# Patient Record
Sex: FEMALE | Race: BLACK OR AFRICAN AMERICAN | NOT HISPANIC OR LATINO
[De-identification: names, ages, dates, MRNs, and addresses within clinical notes are randomized per-mention and may not be internally consistent; named-entity substitution may affect disease eponyms.]

---

## 2023-04-26 ENCOUNTER — APPOINTMENT (OUTPATIENT)
Dept: ORTHOPEDIC SURGERY | Facility: CLINIC | Age: 58
End: 2023-04-26
Payer: COMMERCIAL

## 2023-04-26 ENCOUNTER — NON-APPOINTMENT (OUTPATIENT)
Age: 58
End: 2023-04-26

## 2023-04-26 VITALS — BODY MASS INDEX: 27.25 KG/M2 | HEIGHT: 69 IN | WEIGHT: 184 LBS

## 2023-04-26 PROCEDURE — 73564 X-RAY EXAM KNEE 4 OR MORE: CPT | Mod: LT

## 2023-04-26 PROCEDURE — 20610 DRAIN/INJ JOINT/BURSA W/O US: CPT

## 2023-04-26 PROCEDURE — 99204 OFFICE O/P NEW MOD 45 MIN: CPT | Mod: 25

## 2023-04-26 RX ORDER — METHYLPREDNISOLONE 4 MG/1
4 TABLET ORAL
Qty: 1 | Refills: 0 | Status: ACTIVE | COMMUNITY
Start: 2023-04-26 | End: 1900-01-01

## 2023-04-26 NOTE — PROCEDURE
[FreeTextEntry3] : Aspiration Procedure Note:\par \par The risks, benefits, and alternatives to aspiration were reviewed with the patient.  Risks outlined include but are not limited to infection, sepsis, bleeding, temporary increase in pain, syncopal episode, failure to resolve symptoms, and symptoms recurrence. Patient understood the risks and asked to proceed with this treatment course.\par \par Patient Identification\par Name/: Verbal with patient and/or family\par \par Procedure Verification:\par Procedure confirmed with patient or family/designee\par Consent for procedure: Verbal Consent Given\par Relevant documentation completed, reviewed, and signed\par Clinical indications for procedure confirmed\par \par Time-out with all members of procedure team immediately prior to procedure:\par Correct patient identified. Agreement on procedure. Correct side and site.\par \par KNEE ASPIRATION - LEFT\par After verbal consent and identification of the correct patient and correct site, the superolateral left knee was prepped using alcohol swabs and betadine. This was allowed time to air dry. \par 47 cc of yellow fluid was aspirated from the knee using a sterile 18G needle after ethyl chloride spray for skin anesthesia. The patient tolerated the procedure well. After-care instructions were provided and included instructions to ice the area and to call if redness, pain, or fever develop.\par

## 2023-04-26 NOTE — ASSESSMENT
[FreeTextEntry1] : Imaging was reviewed and independently interpreted\par [facility] tircomp deg, MMT, synov, fat pad scarring, acl mucoid change\par \par  Left X-Ray Examination of the KNEE (4 views): medial and patellofemoral degenerate changes.\par \par - We discussed their diagnosis and treatment options at length including the risks and benefits of both surgical treatment with a knee replacement and non-surgical options.\par - We will continue conservative treatment with activity modification, PT, icing, weight loss, and anti-inflammatory medications.\par - The patient was provided with a PT prescription to work on ROM, hip ER/abductors strengthening, quad/hamstring stretches and strengthening, and other exercises \par - The patient was advised to let pain guide the gradual advancement of activities. \par - asp knee sheyla well\par - MDP rx\par - Discussed the possible side effects of medication along with the timing and frequency for taking.\par - Follow up as needed in 6 weeks to re-evaluate, if no improvement we spoke about possibility of viscosupplementation injections\par

## 2023-04-26 NOTE — IMAGING
[de-identified] : \par LEFT KNEE\par Inspection:  mod effusion, pop cyst\par Palpation: medial joint line tenderness, anterior tenderness\par Knee Range of Motion:  3-125 \par Strength: 5/5 Quadriceps strength, 5/5 Hamstring strength\par Neurological: light touch is intact throughout\par Ligament Stability and Special Tests: \par McMurrays: pos\par Lachman: neg\par Pivot Shift: neg\par Posterior Drawer: neg\par Valgus: neg\par Varus: neg\par Patella Apprehension: neg\par Patella Maltracking: neg\par

## 2023-04-26 NOTE — HISTORY OF PRESENT ILLNESS
[de-identified] : 57 year old female  (RHD,  LIRR )  left knee pain since 12/2021 , had meniscus surgery but never really got better, pain worsening since 2023\par The pain is located  anterior, medial, and deep\par The pain is associated with  swelling, clicking, buckling \par Worse with activity and better at rest.\par Has tried ice, nsaids, cane, PT\par H/O left knee meniscus sx 5/2022 - Dr. Jairo Carr

## 2023-04-28 ENCOUNTER — APPOINTMENT (OUTPATIENT)
Dept: ORTHOPEDIC SURGERY | Facility: CLINIC | Age: 58
End: 2023-04-28
Payer: COMMERCIAL

## 2023-04-28 PROCEDURE — L1833: CPT | Mod: LT

## 2023-05-31 ENCOUNTER — APPOINTMENT (OUTPATIENT)
Dept: ORTHOPEDIC SURGERY | Facility: CLINIC | Age: 58
End: 2023-05-31
Payer: COMMERCIAL

## 2023-05-31 ENCOUNTER — NON-APPOINTMENT (OUTPATIENT)
Age: 58
End: 2023-05-31

## 2023-05-31 VITALS — HEIGHT: 69 IN | WEIGHT: 190 LBS | BODY MASS INDEX: 28.14 KG/M2

## 2023-05-31 PROCEDURE — 99214 OFFICE O/P EST MOD 30 MIN: CPT | Mod: 25

## 2023-05-31 PROCEDURE — 20610 DRAIN/INJ JOINT/BURSA W/O US: CPT

## 2023-05-31 PROCEDURE — J3490M: CUSTOM

## 2023-05-31 NOTE — IMAGING
[de-identified] : \par LEFT KNEE\par Inspection:  mod effusion, pop cyst\par Palpation: medial joint line tenderness, anterior tenderness\par Knee Range of Motion:  3-125 \par Strength: 5/5 Quadriceps strength, 5/5 Hamstring strength\par Neurological: light touch is intact throughout\par Ligament Stability and Special Tests: \par McMurrays: pos\par Lachman: neg\par Pivot Shift: neg\par Posterior Drawer: neg\par Valgus: neg\par Varus: neg\par Patella Apprehension: neg\par Patella Maltracking: neg\par

## 2023-05-31 NOTE — WORK
[Does not reveal pre-existing condition(s) that may affect treatment/prognosis] : does not reveal pre-existing condition(s) that may affect treatment/prognosis [Patient] : patient [I provided the services listed above] :  I provided the services listed above. [Total (100%)] : total (100%) [Cannot return to work because ________] : cannot return to work because [unfilled]

## 2023-05-31 NOTE — ASSESSMENT
[FreeTextEntry1] : mri left knee [facility] 4/18/23 - tircomp deg, MMT, synov, fat pad scarring, acl mucoid change\par \par now with exab of underlying arthritis and meniscus tear subsqeuent to her work injury \par \par - We discussed their diagnosis and treatment options at length including the risks and benefits of both surgical treatment with a knee replacement and non-surgical options.\par - We will continue conservative treatment with activity modification, PT, icing, weight loss, and anti-inflammatory medications.\par - The patient was provided with a PT prescription to work on ROM, hip ER/abductors strengthening, quad/hamstring stretches and strengthening, and other exercises \par - The patient was advised to let pain guide the gradual advancement of activities. \par - We also discussed the possible of a corticosteroid injection in order to help decrease inflammation and pain so that they can perform better therapy and they wished to proceed with this treatment course..\par - Follow up as needed in 6 weeks to re-evaluate, if no improvement we spoke about possibility of viscosupplementation injections - will submit for auth\par - oow cant perform her normal duties\par \par (may eventually need TKA)\par

## 2023-05-31 NOTE — HISTORY OF PRESENT ILLNESS
[de-identified] : \par 57 year old female  (RHD,  LIRR )  left knee pain since injury at work 12/22/2021 , had subq meniscus surgery and went to PT but never really got better, pain worsening now since 2023\par The pain is located  anterior, medial, and deep\par The pain is associated with  swelling, clicking, buckling \par Worse with activity and better at rest.\par Has tried ice, nsaids, cane, PT\par H/O left knee meniscus sx 5/2022 - Dr. Jairo Carr\par no prior knee issues before her work injury\par \par 5/31/23 - asp knee, sent mdp, cont to have pain along with buckling and giving out, has been oow

## 2023-06-28 ENCOUNTER — APPOINTMENT (OUTPATIENT)
Dept: ORTHOPEDIC SURGERY | Facility: CLINIC | Age: 58
End: 2023-06-28
Payer: COMMERCIAL

## 2023-06-28 PROCEDURE — 99214 OFFICE O/P EST MOD 30 MIN: CPT | Mod: 25

## 2023-06-28 PROCEDURE — 20610 DRAIN/INJ JOINT/BURSA W/O US: CPT

## 2023-06-28 NOTE — IMAGING
[de-identified] : \par LEFT KNEE\par Inspection:  mod effusion, pop cyst\par Palpation: medial joint line tenderness, anterior tenderness\par Knee Range of Motion:  3-125 \par Strength: 5/5 Quadriceps strength, 5/5 Hamstring strength\par Neurological: light touch is intact throughout\par Ligament Stability and Special Tests: \par McMurrays: pos\par Lachman: neg\par Pivot Shift: neg\par Posterior Drawer: neg\par Valgus: neg\par Varus: neg\par Patella Apprehension: neg\par Patella Maltracking: neg\par

## 2023-06-28 NOTE — HISTORY OF PRESENT ILLNESS
[de-identified] : \par 57 year old female  (RHD,  LIRR )  left knee pain since injury at work 12/22/2021 , had subq meniscus surgery and went to PT but never really got better, pain worsening now since 2023\par The pain is located  anterior, medial, and deep\par The pain is associated with  swelling, clicking, buckling \par Worse with activity and better at rest.\par Has tried ice, nsaids, cane, PT\par H/O left knee meniscus sx 5/2022 - Dr. Jairo Carr\par no prior knee issues before her work injury\par \par 5/31/23 - asp knee, sent mdp, cont to have pain along with buckling and giving out, has been oow\par 6/28/23- here with continued left knee pain, CSI (5/31) with temp relief, would like to discuss poss visco injections

## 2023-06-28 NOTE — ASSESSMENT
[FreeTextEntry1] : mri left knee [facility] 4/18/23 - tircomp deg, MMT, synov, fat pad scarring, acl mucoid change\par \par now with exab of underlying arthritis and meniscus tear subsqeuent to her work injury \par \par - We discussed their diagnosis and treatment options at length including the risks and benefits of both surgical treatment with a knee replacement and non-surgical options.\par - We will continue conservative treatment with activity modification, PT, icing, weight loss, and anti-inflammatory medications.\par - The patient was provided with a PT prescription to work on ROM, hip ER/abductors strengthening, quad/hamstring stretches and strengthening, and other exercises \par - The patient was advised to let pain guide the gradual advancement of activities. \par - We also discussed the possibility of viscosupplementation injections and she would like to proceed\par - left knee euflexxa #1 - sheyla well \par - oow cant perform her normal duties\par \par (may eventually need TKA)\par

## 2023-07-05 ENCOUNTER — APPOINTMENT (OUTPATIENT)
Dept: ORTHOPEDIC SURGERY | Facility: CLINIC | Age: 58
End: 2023-07-05

## 2023-07-10 ENCOUNTER — APPOINTMENT (OUTPATIENT)
Dept: ORTHOPEDIC SURGERY | Facility: CLINIC | Age: 58
End: 2023-07-10
Payer: COMMERCIAL

## 2023-07-10 PROCEDURE — 99212 OFFICE O/P EST SF 10 MIN: CPT | Mod: 25

## 2023-07-10 PROCEDURE — 20610 DRAIN/INJ JOINT/BURSA W/O US: CPT

## 2023-07-10 NOTE — HISTORY OF PRESENT ILLNESS
[de-identified] : \par 57 year old female  (RHD,  LIRR )  left knee pain since injury at work 12/22/2021 , had subq meniscus surgery and went to PT but never really got better, pain worsening now since 2023\par The pain is located  anterior, medial, and deep\par The pain is associated with  swelling, clicking, buckling \par Worse with activity and better at rest.\par Has tried ice, nsaids, cane, PT\par H/O left knee meniscus sx 5/2022 - Dr. Jairo Carr\par no prior knee issues before her work injury\par \par 5/31/23 - asp knee, sent mdp, cont to have pain along with buckling and giving out, has been oow\par 6/28/23- here with continued left knee pain, CSI (5/31) with temp relief, would like to discuss poss visco injections  \par 7/10/23- left knee euflexxa #2

## 2023-07-10 NOTE — PROCEDURE
[FreeTextEntry3] : \par Injection Procedure Note:\par \par The risks, benefits, and alternatives to viscosupplementation injection were reviewed with the patient. Risks outlined include but are not limited to infection, sepsis, bleeding, scarring, temporary increase in pain, syncopal episode, failure to resolve symptoms, symptoms recurrence, allergic reaction, flare reaction, pseudoseptic reaction.  Patient understood the risks and asked to proceed with this treatment course.\par \par Patient Identification\par Name/: Verbal with patient and/or family\par \par Procedure Verification:\par Procedure confirmed with patient or family/designee\par Consent for procedure: Verbal Consent Given\par Relevant documentation completed, reviewed, and signed\par Clinical indications for procedure confirmed\par \par Time-out with all members of procedure team immediately prior to procedure:\par Correct patient identified. Agreement on procedure. Correct side and site.\par \par \par KNEE INJECTION (Visco) - LEFT\par After verbal consent and identification of the correct patient and correct site, the left knee were prepped using alcohol swabs and betadine. This was allowed time to air dry. \par An injection of EUFLEXXA 2ml  #2 \par was injected into the knee using a sterile 22G needle after ethyl chloride spray for skin anesthesia.  The patient tolerated the procedure well. After-care instructions were provided and included instructions to ice the area and to call if redness, pain, or fever develop.\par

## 2023-07-10 NOTE — ASSESSMENT
[FreeTextEntry1] : mri left knee [facility] 4/18/23 - tircomp deg, MMT, synov, fat pad scarring, acl mucoid change\par \par now with exab of underlying arthritis and meniscus tear subsqeuent to her work injury \par \par - We discussed their diagnosis and treatment options at length including the risks and benefits of both surgical treatment with a knee replacement and non-surgical options.\par - We will continue conservative treatment with activity modification, PT, icing, weight loss, and anti-inflammatory medications.\par - The patient was provided with a PT prescription to work on ROM, hip ER/abductors strengthening, quad/hamstring stretches and strengthening, and other exercises \par - The patient was advised to let pain guide the gradual advancement of activities. \par - We also discussed the possibility of viscosupplementation injections and she would like to proceed\par - left knee euflexxa #2 - sheyla well \par - oow cant perform her normal duties\par \par (may eventually need TKA)\par

## 2023-07-10 NOTE — IMAGING
[de-identified] : \par LEFT KNEE\par Inspection:  mod effusion, pop cyst\par Palpation: medial joint line tenderness, anterior tenderness\par Knee Range of Motion:  3-125 \par Strength: 5/5 Quadriceps strength, 5/5 Hamstring strength\par Neurological: light touch is intact throughout\par Ligament Stability and Special Tests: \par McMurrays: pos\par Lachman: neg\par Pivot Shift: neg\par Posterior Drawer: neg\par Valgus: neg\par Varus: neg\par Patella Apprehension: neg\par Patella Maltracking: neg\par

## 2023-07-17 ENCOUNTER — APPOINTMENT (OUTPATIENT)
Dept: ORTHOPEDIC SURGERY | Facility: CLINIC | Age: 58
End: 2023-07-17
Payer: COMMERCIAL

## 2023-07-17 DIAGNOSIS — S83.232A COMPLEX TEAR OF MEDIAL MENISCUS, CURRENT INJURY, LEFT KNEE, INITIAL ENCOUNTER: ICD-10-CM

## 2023-07-17 PROCEDURE — 99212 OFFICE O/P EST SF 10 MIN: CPT | Mod: 25

## 2023-07-17 PROCEDURE — 20610 DRAIN/INJ JOINT/BURSA W/O US: CPT | Mod: LT

## 2023-07-17 NOTE — HISTORY OF PRESENT ILLNESS
[de-identified] : \par 57 year old female  (RHD,  LIRR )  left knee pain since injury at work 12/22/2021 , had subq meniscus surgery and went to PT but never really got better, pain worsening now since 2023\par The pain is located  anterior, medial, and deep\par The pain is associated with  swelling, clicking, buckling \par Worse with activity and better at rest.\par Has tried ice, nsaids, cane, PT\par H/O left knee meniscus sx 5/2022 - Dr. Jairo Carr\par no prior knee issues before her work injury\par \par 5/31/23 - asp knee, sent mdp, cont to have pain along with buckling and giving out, has been oow\par 6/28/23- here with continued left knee pain, CSI (5/31) with temp relief, would like to discuss poss visco injections  \par 7/10/23- left knee euflexxa #2\par 7/17/23- left knee euflexxa #3

## 2023-07-17 NOTE — PROCEDURE
[FreeTextEntry3] : \par Injection Procedure Note:\par \par The risks, benefits, and alternatives to viscosupplementation injection were reviewed with the patient. Risks outlined include but are not limited to infection, sepsis, bleeding, scarring, temporary increase in pain, syncopal episode, failure to resolve symptoms, symptoms recurrence, allergic reaction, flare reaction, pseudoseptic reaction.  Patient understood the risks and asked to proceed with this treatment course.\par \par Patient Identification\par Name/: Verbal with patient and/or family\par \par Procedure Verification:\par Procedure confirmed with patient or family/designee\par Consent for procedure: Verbal Consent Given\par Relevant documentation completed, reviewed, and signed\par Clinical indications for procedure confirmed\par \par Time-out with all members of procedure team immediately prior to procedure:\par Correct patient identified. Agreement on procedure. Correct side and site.\par \par \par KNEE INJECTION (Visco) - LEFT\par After verbal consent and identification of the correct patient and correct site, the left knee were prepped using alcohol swabs and betadine. This was allowed time to air dry. \par An injection of EUFLEXXA 2ml  #3 \par was injected into the knee using a sterile 22G needle after ethyl chloride spray for skin anesthesia.  The patient tolerated the procedure well. After-care instructions were provided and included instructions to ice the area and to call if redness, pain, or fever develop.\par

## 2023-07-17 NOTE — IMAGING
[de-identified] : \par LEFT KNEE\par Inspection:  mod effusion, pop cyst\par Palpation: medial joint line tenderness, anterior tenderness\par Knee Range of Motion:  3-125 \par Strength: 5/5 Quadriceps strength, 5/5 Hamstring strength\par Neurological: light touch is intact throughout\par Ligament Stability and Special Tests: \par McMurrays: pos\par Lachman: neg\par Pivot Shift: neg\par Posterior Drawer: neg\par Valgus: neg\par Varus: neg\par Patella Apprehension: neg\par Patella Maltracking: neg\par

## 2023-07-17 NOTE — ASSESSMENT
[FreeTextEntry1] : mri left knee [facility] 4/18/23 - tircomp deg, MMT, synov, fat pad scarring, acl mucoid change\par \par now with exab of underlying arthritis and meniscus tear subsqeuent to her work injury \par \par - We discussed their diagnosis and treatment options at length including the risks and benefits of both surgical treatment with a knee replacement and non-surgical options.\par - We will continue conservative treatment with activity modification, PT, icing, weight loss, and anti-inflammatory medications.\par - The patient was provided with a PT prescription to work on ROM, hip ER/abductors strengthening, quad/hamstring stretches and strengthening, and other exercises \par - The patient was advised to let pain guide the gradual advancement of activities. \par - We also discussed the possibility of viscosupplementation injections and she would like to proceed\par - left knee euflexxa #3 - sheyla well \par - oow cant perform her normal duties\par \par (may eventually need TKA)\par

## 2023-08-28 ENCOUNTER — APPOINTMENT (OUTPATIENT)
Dept: ORTHOPEDIC SURGERY | Facility: CLINIC | Age: 58
End: 2023-08-28
Payer: COMMERCIAL

## 2023-08-28 VITALS — HEIGHT: 69 IN | BODY MASS INDEX: 27.4 KG/M2 | WEIGHT: 185 LBS

## 2023-08-28 PROCEDURE — 99213 OFFICE O/P EST LOW 20 MIN: CPT

## 2023-08-28 NOTE — IMAGING
[de-identified] : \par  LEFT KNEE\par  Inspection:  mod effusion, pop cyst\par  Palpation: medial joint line tenderness, anterior tenderness\par  Knee Range of Motion:  3-125 \par  Strength: 5/5 Quadriceps strength, 5/5 Hamstring strength\par  Neurological: light touch is intact throughout\par  Ligament Stability and Special Tests: \par  McMurrays: pos\par  Lachman: neg\par  Pivot Shift: neg\par  Posterior Drawer: neg\par  Valgus: neg\par  Varus: neg\par  Patella Apprehension: neg\par  Patella Maltracking: neg\par

## 2023-08-28 NOTE — HISTORY OF PRESENT ILLNESS
[de-identified] : 57 year old female  (RHD,  LIRR )  left knee pain since injury at work 12/22/2021 , had subq meniscus surgery and went to PT but never really got better, pain worsening now since 2023 The pain is located  anterior, medial, and deep The pain is associated with  swelling, clicking, buckling  Worse with activity and better at rest. Has tried ice, nsaids, cane, PT H/O left knee meniscus sx 5/2022 - Dr. Jairo Carr no prior knee issues before her work injury  5/31/23 - asp knee, sent mdp, cont to have pain along with buckling and giving out, has been oow 6/28/23- here with continued left knee pain, CSI (5/31) with temp relief, would like to discuss poss visco injections   7/10/23- left knee euflexxa #2 7/17/23- left knee euflexxa #3 8/28/23- doing PT and HEP

## 2023-08-28 NOTE — ASSESSMENT
[FreeTextEntry1] : mri left knee [facility] 4/18/23 - tircomp deg, MMT, synov, fat pad scarring, acl mucoid change  now with exab of underlying arthritis and meniscus tear subsqeuent to her work injury   - We discussed their diagnosis and treatment options at length including the risks and benefits of both surgical treatment with a knee replacement and non-surgical options. - We will continue conservative treatment with activity modification, PT, icing, weight loss, and anti-inflammatory medications. - The patient was provided with a PT prescription to work on ROM, hip ER/abductors strengthening, quad/hamstring stretches and strengthening, and other exercises  - The patient was advised to let pain guide the gradual advancement of activities.  - can return to work  (may eventually need TKA)

## 2023-09-06 ENCOUNTER — APPOINTMENT (OUTPATIENT)
Dept: ORTHOPEDIC SURGERY | Facility: CLINIC | Age: 58
End: 2023-09-06
Payer: COMMERCIAL

## 2023-09-06 PROCEDURE — 99213 OFFICE O/P EST LOW 20 MIN: CPT

## 2023-09-06 RX ORDER — METHYLPREDNISOLONE 4 MG/1
4 TABLET ORAL
Qty: 1 | Refills: 0 | Status: ACTIVE | COMMUNITY
Start: 2023-09-06 | End: 1900-01-01

## 2023-09-06 NOTE — HISTORY OF PRESENT ILLNESS
[de-identified] : 57 year old female  (RHD,  LIRR )  left knee pain since injury at work 12/22/2021 , had subq meniscus surgery and went to PT but never really got better, pain worsening now since 2023 The pain is located  anterior, medial, and deep The pain is associated with  swelling, clicking, buckling  Worse with activity and better at rest. Has tried ice, nsaids, cane, PT H/O left knee meniscus sx 5/2022 - Dr. Jairo Carr no prior knee issues before her work injury  5/31/23 - asp knee, sent mdp, cont to have pain along with buckling and giving out, has been oow 6/28/23- here with continued left knee pain, CSI (5/31) with temp relief, would like to discuss poss visco injections   7/10/23- left knee euflexxa #2 7/17/23- left knee euflexxa #3 8/28/23- doing PT and HEP 9/6/23- L knee improving with PT but getting cut off from PT by insurance,  doing HEP

## 2023-09-06 NOTE — ASSESSMENT
[FreeTextEntry1] : mri left knee [facility] 4/18/23 - tircomp deg, MMT, synov, fat pad scarring, acl mucoid change  now with exab of underlying arthritis and meniscus tear subsqeuent to her work injury   - We discussed their diagnosis and treatment options at length including the risks and benefits of both surgical treatment with a knee replacement and non-surgical options. - We will continue conservative treatment with activity modification, PT, icing, weight loss, and anti-inflammatory medications. - The patient was provided with a PT prescription to work on ROM, hip ER/abductors strengthening, quad/hamstring stretches and strengthening, and other exercises  - The patient was advised to let pain guide the gradual advancement of activities.  - MDP rx - Discussed possible side effects of medication along with timing and frequency for taking - can return to work  FORMAL REQUEST AUTHORIZATION FOR PHYSICAL THERAPY - Patient needs to continue PT in order to help the patient in their functional return as they have made improvement from this modality.   (may eventually need TKA)

## 2023-09-06 NOTE — IMAGING
[de-identified] : \par  LEFT KNEE\par  Inspection:  mod effusion, pop cyst\par  Palpation: medial joint line tenderness, anterior tenderness\par  Knee Range of Motion:  3-125 \par  Strength: 5/5 Quadriceps strength, 5/5 Hamstring strength\par  Neurological: light touch is intact throughout\par  Ligament Stability and Special Tests: \par  McMurrays: pos\par  Lachman: neg\par  Pivot Shift: neg\par  Posterior Drawer: neg\par  Valgus: neg\par  Varus: neg\par  Patella Apprehension: neg\par  Patella Maltracking: neg\par

## 2023-10-04 ENCOUNTER — APPOINTMENT (OUTPATIENT)
Dept: ORTHOPEDIC SURGERY | Facility: CLINIC | Age: 58
End: 2023-10-04
Payer: COMMERCIAL

## 2023-10-04 VITALS — WEIGHT: 182 LBS | BODY MASS INDEX: 26.96 KG/M2 | HEIGHT: 69 IN

## 2023-10-04 PROCEDURE — 99214 OFFICE O/P EST MOD 30 MIN: CPT | Mod: 25

## 2023-10-04 PROCEDURE — J3490M: CUSTOM

## 2023-10-04 PROCEDURE — 20610 DRAIN/INJ JOINT/BURSA W/O US: CPT | Mod: LT

## 2023-10-26 ENCOUNTER — APPOINTMENT (OUTPATIENT)
Dept: ORTHOPEDIC SURGERY | Facility: CLINIC | Age: 58
End: 2023-10-26

## 2024-01-03 ENCOUNTER — APPOINTMENT (OUTPATIENT)
Dept: ORTHOPEDIC SURGERY | Facility: CLINIC | Age: 59
End: 2024-01-03
Payer: COMMERCIAL

## 2024-01-03 PROCEDURE — 99213 OFFICE O/P EST LOW 20 MIN: CPT

## 2024-01-03 NOTE — ASSESSMENT
[FreeTextEntry1] : mri left knee [facility] 4/18/23 - tircomp deg, MMT, synov, fat pad scarring, acl mucoid change  now with exab of underlying arthritis and meniscus tear subsqeuent to her work injury  - We discussed their diagnosis and treatment options at length including the risks and benefits of both surgical treatment with a knee replacement and non-surgical options. - We will continue conservative treatment with activity modification, PT, icing, weight loss, and anti-inflammatory medications. - The patient was provided with a PT prescription to work on ROM, hip ER/abductors strengthening, quad/hamstring stretches and strengthening, and other exercises - The patient is to continue home exercises learned at physical therapy. - The patient was advised to let pain guide the gradual advancement of activities. - MDP - Discussed possible side effects of medication along with timing and frequency for taking   **I have reviewed her job description and the patient is unable to continue to work as a  because it will continue to aggravate her knee. 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

## 2024-01-03 NOTE — IMAGING
[de-identified] : \par  LEFT KNEE\par  Inspection:  mod effusion, pop cyst\par  Palpation: medial joint line tenderness, anterior tenderness\par  Knee Range of Motion:  3-125 \par  Strength: 5/5 Quadriceps strength, 5/5 Hamstring strength\par  Neurological: light touch is intact throughout\par  Ligament Stability and Special Tests: \par  McMurrays: pos\par  Lachman: neg\par  Pivot Shift: neg\par  Posterior Drawer: neg\par  Valgus: neg\par  Varus: neg\par  Patella Apprehension: neg\par  Patella Maltracking: neg\par

## 2024-01-03 NOTE — HISTORY OF PRESENT ILLNESS
[de-identified] : 58 year old female  (RHD,  LIRR )  left knee pain since injury at work 12/22/2021 , had subq meniscus surgery and went to PT but never really got better, pain worsening now since 2023 The pain is located  anterior, medial, and deep The pain is associated with  swelling, clicking, buckling  Worse with activity and better at rest. Has tried ice, nsaids, cane, PT H/O left knee meniscus sx 5/2022 - Dr. Jairo aCrr no prior knee issues before her work injury  5/31/23 - asp knee, sent mdp, cont to have pain along with buckling and giving out, has been oow 6/28/23- here with continued left knee pain, CSI (5/31) with temp relief, would like to discuss poss visco injections   7/10/23 - left knee euflexxa #2 7/17/23 - left knee euflexxa #3 8/28/23 - doing PT and HEP 9/6/23- L knee improving with PT but getting cut off from PT by insurance,  doing HEP 10/4/23-  doing HEP, cont achiness and stiffness continues 1/3/23- had CSI ( 10/4)  with good temp relif, still some achiness and pain now

## 2024-03-13 ENCOUNTER — APPOINTMENT (OUTPATIENT)
Dept: ORTHOPEDIC SURGERY | Facility: CLINIC | Age: 59
End: 2024-03-13
Payer: COMMERCIAL

## 2024-03-13 PROCEDURE — 99213 OFFICE O/P EST LOW 20 MIN: CPT

## 2024-03-13 NOTE — HISTORY OF PRESENT ILLNESS
[de-identified] : 58 year old female  (RHD,  LIRR )  left knee pain since injury at work 12/22/2021 , had subq meniscus surgery and went to PT but never really got better, pain worsening now since 2023 The pain is located  anterior, medial, and deep The pain is associated with  swelling, clicking, buckling  Worse with activity and better at rest. Has tried ice, nsaids, cane, PT H/O left knee meniscus sx 5/2022 - Dr. Jairo Carr no prior knee issues before her work injury  5/31/23 - asp knee, sent mdp, cont to have pain along with buckling and giving out, has been oow 6/28/23- here with continued left knee pain, CSI (5/31) with temp relief, would like to discuss poss visco injections   7/10/23 - left knee euflexxa #2 7/17/23 - left knee euflexxa #3 8/28/23 - doing PT and HEP 9/6/23- L knee improving with PT but getting cut off from PT by insurance,  doing HEP 10/4/23-  doing HEP, cont achiness and stiffness continues 1/3/23- had CSI ( 10/4)  with good temp relif, still some achiness and pain now 3/13/24- L knee improving, going to PT (EVELYN Garber)  and doing HEP, and doing acuunopunture

## 2024-03-13 NOTE — ASSESSMENT
[FreeTextEntry1] : mri left knee [facility] 4/18/23 - tircomp deg, MMT, synov, fat pad scarring, acl mucoid change  now with exab of underlying arthritis and meniscus tear subsqeuent to her work injury  - We discussed their diagnosis and treatment options at length including the risks and benefits of both surgical treatment with a knee replacement and non-surgical options. - We will continue conservative treatment with activity modification, PT, icing, weight loss, and anti-inflammatory medications. - The patient was provided with a PT prescription to work on ROM, hip ER/abductors strengthening, quad/hamstring stretches and strengthening, and other exercises - The patient is to continue home exercises learned at physical therapy. - The patient was advised to let pain guide the gradual advancement of activities. - The patient was advised to apply ice (wrapped in a towel or protective covering) to the area daily (20 minutes at a time, 2-4X/day). - submit for auth for visco - discussed poss PRP inj  **I have reviewed her job description and the patient is unable to continue to work as a  because it will continue to aggravate her knee. 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

## 2024-03-13 NOTE — IMAGING
[de-identified] : \par  LEFT KNEE\par  Inspection:  mod effusion, pop cyst\par  Palpation: medial joint line tenderness, anterior tenderness\par  Knee Range of Motion:  3-125 \par  Strength: 5/5 Quadriceps strength, 5/5 Hamstring strength\par  Neurological: light touch is intact throughout\par  Ligament Stability and Special Tests: \par  McMurrays: pos\par  Lachman: neg\par  Pivot Shift: neg\par  Posterior Drawer: neg\par  Valgus: neg\par  Varus: neg\par  Patella Apprehension: neg\par  Patella Maltracking: neg\par

## 2024-03-20 ENCOUNTER — TRANSCRIPTION ENCOUNTER (OUTPATIENT)
Age: 59
End: 2024-03-20

## 2024-05-14 ENCOUNTER — APPOINTMENT (OUTPATIENT)
Dept: OTOLARYNGOLOGY | Facility: CLINIC | Age: 59
End: 2024-05-14
Payer: COMMERCIAL

## 2024-05-14 VITALS — WEIGHT: 180 LBS | BODY MASS INDEX: 26.66 KG/M2 | HEIGHT: 69 IN

## 2024-05-14 DIAGNOSIS — Z82.49 FAMILY HISTORY OF ISCHEMIC HEART DISEASE AND OTHER DISEASES OF THE CIRCULATORY SYSTEM: ICD-10-CM

## 2024-05-14 DIAGNOSIS — Z78.9 OTHER SPECIFIED HEALTH STATUS: ICD-10-CM

## 2024-05-14 DIAGNOSIS — Z87.39 PERSONAL HISTORY OF OTHER DISEASES OF THE MUSCULOSKELETAL SYSTEM AND CONNECTIVE TISSUE: ICD-10-CM

## 2024-05-14 DIAGNOSIS — Z86.2 PERSONAL HISTORY OF DISEASES OF THE BLOOD AND BLOOD-FORMING ORGANS AND CERTAIN DISORDERS INVOLVING THE IMMUNE MECHANISM: ICD-10-CM

## 2024-05-14 DIAGNOSIS — K21.9 GASTRO-ESOPHAGEAL REFLUX DISEASE W/OUT ESOPHAGITIS: ICD-10-CM

## 2024-05-14 DIAGNOSIS — R05.3 CHRONIC COUGH: ICD-10-CM

## 2024-05-14 PROCEDURE — 31575 DIAGNOSTIC LARYNGOSCOPY: CPT

## 2024-05-14 PROCEDURE — 99203 OFFICE O/P NEW LOW 30 MIN: CPT | Mod: 25

## 2024-05-14 RX ORDER — MULTIVIT-MIN/IRON/FOLIC ACID/K 18-600-40
CAPSULE ORAL
Refills: 0 | Status: ACTIVE | COMMUNITY

## 2024-05-14 RX ORDER — FAMOTIDINE 40 MG/1
40 TABLET, FILM COATED ORAL
Qty: 30 | Refills: 2 | Status: ACTIVE | COMMUNITY
Start: 2024-05-14 | End: 1900-01-01

## 2024-05-14 NOTE — HISTORY OF PRESENT ILLNESS
[de-identified] : TOSHIA MARIN  is a 58 year woman with a history of URI October/November. Cold improved but she has cough and PND. She had minor barotrauma end of October are still intermittently clogged.

## 2024-05-14 NOTE — ASSESSMENT
[FreeTextEntry1] : TOSHAI NUNEZ has LPR causing her symptoms I suggested and discussed in detail a strict reflux diet and gave the patient a handout.  I am recommending Pepcid 40mg  before bedtime. RTC 4 weeks

## 2024-05-14 NOTE — PHYSICAL EXAM
[TextEntry] : PHYSICAL EXAM  General: The patient was alert and oriented and in no distress. Voice was normal.    EOM's: Normal  Face: The patient had no facial asymmetry or mass. The skin was unremarkable.  Ears: External ears were normal without deformity. Ear canals were normal. Tympanic membranes were intact and normal. No perforation or effusion  Nose:  The external nose had no significant deformity.  There was no facial tenderness.  On anterior rhinoscopy, the nasal mucosa was normal.  The anterior septum was normal. There were no visualized polyps purulence  or masses.  Oral cavity: The oral mucosa was normal. The oral and base of tongue were clear and without mass. The gingival and buccal mucosa were moist and without lesions. The palate moved well. There was no cleft palate. There appeared to be good salivary flow.   There was no pus, erythema or mass in the oral cavity/oropharynx.  Neck:  The neck was symmetrical. The parotid and submandibular glands were normal without masses. The trachea was midline and there was no unusual crepitus. Thyroid was smooth and nontender and no masses were palpated. Cervical adenopathy- none.  Procedure: Fiberoptic Nasolaryngoscopy Dx: Dysphagia, LPRD, Hoarseness Dr. Evens Rivera Anesthetic: Lidocaine and oxymetazoline topical   Findings: The flexible scope was easily passed via the nose. The larynx was grossly normal. The epiglottis was normal. The hypopharynx and base of tongue were normal. The vallecula was normal. The vocal folds were grossly normal and moved normally. There are  mild changes of laryngopharyngeal reflux  (LPR)  manifest by mild ventricular swelling,  posterior commissure thickening.   No lesions were noted.

## 2024-05-14 NOTE — REASON FOR VISIT
[Initial Evaluation] : an initial evaluation for [Ear Pain] : ear pain [FreeTextEntry2] : ear nose and throat

## 2024-05-20 ENCOUNTER — APPOINTMENT (OUTPATIENT)
Dept: ORTHOPEDIC SURGERY | Facility: CLINIC | Age: 59
End: 2024-05-20
Payer: COMMERCIAL

## 2024-05-20 DIAGNOSIS — M17.12 UNILATERAL PRIMARY OSTEOARTHRITIS, LEFT KNEE: ICD-10-CM

## 2024-05-20 PROCEDURE — 99214 OFFICE O/P EST MOD 30 MIN: CPT

## 2024-05-20 NOTE — HISTORY OF PRESENT ILLNESS
[de-identified] :  58 year old female  (RHD,  LIRR )  left knee pain since injury at work 12/22/2021 , had subq meniscus surgery and went to PT but never really got better, pain worsening now since 2023 The pain is located  anterior, medial, and deep The pain is associated with  swelling, clicking, buckling  Worse with activity and better at rest. Has tried ice, nsaids, cane, PT H/O left knee meniscus sx 5/2022 - Dr. Jairo Carr no prior knee issues before her work injury  5/31/23 - asp knee, sent mdp, cont to have pain along with buckling and giving out, has been oow 6/28/23- here with continued left knee pain, CSI (5/31) with temp relief, would like to discuss poss visco injections   7/10/23 - left knee euflexxa #2 7/17/23 - left knee euflexxa #3 8/28/23 - doing PT and HEP 9/6/23- L knee improving with PT but getting cut off from PT by insurance,  doing HEP 10/4/23-  doing HEP, cont achiness and stiffness continues 1/3/23- had CSI ( 10/4)  with good temp relif, still some achiness and pain now 3/13/24- L knee improving, going to PT (EVELYN Garber)  and doing HEP, and doing acuunopunture 5/20/24 -  doing HEP, still pain  with adls

## 2024-05-20 NOTE — IMAGING
[de-identified] : LEFT KNEE Inspection:  mod effusion, pop cyst Palpation: medial joint line tenderness, anterior tenderness Knee Range of Motion:  3-125  Strength: 5/5 Quadriceps strength, 5/5 Hamstring strength Neurological: light touch is intact throughout Ligament Stability and Special Tests:  McMurrays: pos Lachman: neg Pivot Shift: neg Posterior Drawer: neg Valgus: neg Varus: neg Patella Apprehension: neg Patella Maltracking: neg    RIGHT KNEE Inspection:  no effusion Palpation: no tenderness Knee Range of Motion:  0-140   Strength: 5/5 Quadriceps strength, 5/5 Hamstring strength Neurological: light touch is intact throughout Ligament Stability and Special Tests:  Able to SLR McMurrays: neg Lachman: neg Pivot Shift: neg Posterior Drawer: neg Valgus: neg Varus: neg Patella Apprehension: neg Patella Maltracking: neg Gait: non-antalgic

## 2024-05-20 NOTE — ASSESSMENT
[FreeTextEntry1] : mri left knee [facility] 4/18/23 - tircomp deg, MMT, synov, fat pad scarring, acl mucoid change   - We discussed their diagnosis and treatment options at length including the risks and benefits of both surgical treatment with a knee replacement and non-surgical options.  Surgical risks include but are not limited to pain, infection, bleeding, vascular injury, numbness, tingling, nerve damage. - Due to risks of surgery, we will continue conservative treatment with PT, icing, and anti-inflammatory medications. - The patient was provided with a PT prescription to work on ROM, hip ER/abductors strengthening, quad/hamstring stretches and strengthening, and other exercises - The patient is to continue home exercises learned at physical therapy. - The patient was advised to let pain guide the gradual advancement of activities. - The patient was advised to apply ice (wrapped in a towel or protective covering) to the area daily (20 minutes at a time, 2-4X/day). - discussed poss PRP inj as well but wants to hold off  (may eventually need TKA).   Medication Discussion: 1) We discussed a comprehensive treatment plan that included possible pharmaceutical management involving the use of prescription strength medications including but not limited to options such as oral Naprosyn 500mg BID, once daily Meloxicam 15 mg, or 500-650 mg Tylenol versus over the counter oral medications in addition to discussing possible topical prescription Pennsaid vs  Voltaren gel. 2) There is a moderate risk of morbidity with further treatment, especially from use of prescription strength medications and possible side effects of these medications which include but are not limited to upset stomach with oral medications, skin reactions to topical medications and GI/cardiac/renal issues with long term use. 3) I recommended that the patient follow-up with their medical physician if there are any significant potential issues with long term medication use such as interactions with current medications or with exacerbation of underlying medical comorbidities. 4) The benefits and risks associated with use of oral and / or topical prescription and over the counter anti-inflammatory medications were discussed with the patient. The patient voiced understanding of the risks including but not limited to bleeding, stroke, kidney dysfunction, heart disease, and were referred to the black box warning label for further information.

## 2024-06-18 ENCOUNTER — APPOINTMENT (OUTPATIENT)
Dept: OTOLARYNGOLOGY | Facility: CLINIC | Age: 59
End: 2024-06-18

## 2024-09-25 ENCOUNTER — APPOINTMENT (OUTPATIENT)
Dept: ORTHOPEDIC SURGERY | Facility: CLINIC | Age: 59
End: 2024-09-25
Payer: COMMERCIAL

## 2024-09-25 DIAGNOSIS — M17.12 UNILATERAL PRIMARY OSTEOARTHRITIS, LEFT KNEE: ICD-10-CM

## 2024-09-25 PROCEDURE — 99214 OFFICE O/P EST MOD 30 MIN: CPT | Mod: 25

## 2024-09-25 PROCEDURE — J3490M: CUSTOM | Mod: NC

## 2024-09-25 PROCEDURE — 20610 DRAIN/INJ JOINT/BURSA W/O US: CPT | Mod: LT

## 2024-09-25 PROCEDURE — 73564 X-RAY EXAM KNEE 4 OR MORE: CPT | Mod: LT

## 2024-09-25 NOTE — ASSESSMENT
[FreeTextEntry1] : mri left knee [facility] 4/18/23 - tircomp deg, MMT, synov, fat pad scarring, acl mucoid change   - We discussed their diagnosis and treatment options at length including the risks and benefits of both surgical treatment with a knee replacement and non-surgical options.  Surgical risks include but are not limited to pain, infection, bleeding, vascular injury, numbness, tingling, nerve damage. - Due to risks of surgery, we will continue conservative treatment with PT, icing, and anti-inflammatory medications. - The patient was provided with a PT prescription to work on ROM, hip ER/abductors strengthening, quad/hamstring stretches and strengthening, and other exercises - The patient is to continue home exercises learned at physical therapy. - The patient was advised to let pain guide the gradual advancement of activities. - The patient was advised to apply ice (wrapped in a towel or protective covering) to the area daily (20 minutes at a time, 2-4X/day). - We also discussed the possible of a corticosteroid injection in order to help decrease inflammation and pain so that they can perform better therapy and they wished to proceed with this treatment course. - L knee CSI given, sheyla well - discussed poss PRP inj as well but wants to hold off  (may eventually need TKA).   Medication Discussion: 1) We discussed a comprehensive treatment plan that included possible pharmaceutical management involving the use of prescription strength medications including but not limited to options such as oral Naprosyn 500mg BID, once daily Meloxicam 15 mg, or 500-650 mg Tylenol versus over the counter oral medications in addition to discussing possible topical prescription Pennsaid vs  Voltaren gel. 2) There is a moderate risk of morbidity with further treatment, especially from use of prescription strength medications and possible side effects of these medications which include but are not limited to upset stomach with oral medications, skin reactions to topical medications and GI/cardiac/renal issues with long term use. 3) I recommended that the patient follow-up with their medical physician if there are any significant potential issues with long term medication use such as interactions with current medications or with exacerbation of underlying medical comorbidities. 4) The benefits and risks associated with use of oral and / or topical prescription and over the counter anti-inflammatory medications were discussed with the patient. The patient voiced understanding of the risks including but not limited to bleeding, stroke, kidney dysfunction, heart disease, and were referred to the black box warning label for further information.

## 2024-09-25 NOTE — IMAGING
[de-identified] : LEFT KNEE Inspection:  mod effusion, pop cyst Palpation: medial joint line tenderness, anterior tenderness Knee Range of Motion:  3-125  Strength: 5/5 Quadriceps strength, 5/5 Hamstring strength Neurological: light touch is intact throughout Ligament Stability and Special Tests:  McMurrays: pos Lachman: neg Pivot Shift: neg Posterior Drawer: neg Valgus: neg Varus: neg Patella Apprehension: neg Patella Maltracking: neg    RIGHT KNEE Inspection:  no effusion Palpation: no tenderness Knee Range of Motion:  0-140   Strength: 5/5 Quadriceps strength, 5/5 Hamstring strength Neurological: light touch is intact throughout Ligament Stability and Special Tests:  Able to SLR McMurrays: neg Lachman: neg Pivot Shift: neg Posterior Drawer: neg Valgus: neg Varus: neg Patella Apprehension: neg Patella Maltracking: neg Gait: non-antalgic

## 2024-09-25 NOTE — HISTORY OF PRESENT ILLNESS
Lactation note:  Initial visit.  MOB explained she has hx of IGT with her other children, and unfortunately was not able to make enough milk. She attempted to latch infant, she was not showing any feeding cues, and was spitty while this LC was present in room. Encouraged MOB to keep offering with feeding cues, or 3 hours of last feeding. Encouraged to do more skin to skin, and work on hand expression and spoon feeding infant.      At this time, MOB wishes to reevaluate need for supplementation, as she states infant is still spitty, and not interested in breastfeeding.   As discussed, she will continue to offer at least every 3 hours, and do hand expression and refeed back to infant.     [facility] Beginnings book given, information and phone number for the [facility] provided, and she is aware assistance available to her after discharge. Invited to Breastfeeding circles.     Encouraged to call for support as needed.      [de-identified] : 58 year old female  (RHD,  LIRR )  left knee pain since injury at work 12/22/2021 , had subq meniscus surgery and went to PT but never really got better, pain worsening now since 2023 The pain is located  anterior, medial, and deep The pain is associated with  swelling, clicking, buckling  Worse with activity and better at rest. Has tried ice, nsaids, cane, PT H/O left knee meniscus sx 5/2022 - Dr. Jairo Carr no prior knee issues before her work injury  5/31/23 - asp knee, sent mdp, cont to have pain along with buckling and giving out, has been oow 6/28/23- here with continued left knee pain, CSI (5/31) with temp relief, would like to discuss poss visco injections   7/10/23 - left knee euflexxa #2 7/17/23 - left knee euflexxa #3 8/28/23 - doing PT and HEP 9/6/23- L knee improving with PT but getting cut off from PT by insurance,  doing HEP 10/4/23-  doing HEP, cont achiness and stiffness continues 1/3/23- had CSI ( 10/4)  with good temp relif, still some achiness and pain now 3/13/24- L knee improving, going to PT (EVELYN Garber)  and doing HEP, and doing acupunture 5/20/24 -  doing HEP, still pain  with adls  9/25/24- doing HEP with improvement, still pain in left knee worse with activtiy

## 2024-10-30 ENCOUNTER — APPOINTMENT (OUTPATIENT)
Dept: ORTHOPEDIC SURGERY | Facility: CLINIC | Age: 59
End: 2024-10-30

## 2024-11-06 ENCOUNTER — APPOINTMENT (OUTPATIENT)
Dept: ORTHOPEDIC SURGERY | Facility: CLINIC | Age: 59
End: 2024-11-06

## 2024-11-13 ENCOUNTER — APPOINTMENT (OUTPATIENT)
Dept: ORTHOPEDIC SURGERY | Facility: CLINIC | Age: 59
End: 2024-11-13

## 2024-11-21 ENCOUNTER — APPOINTMENT (OUTPATIENT)
Dept: ORTHOPEDIC SURGERY | Facility: CLINIC | Age: 59
End: 2024-11-21

## 2025-02-24 ENCOUNTER — APPOINTMENT (OUTPATIENT)
Dept: ORTHOPEDIC SURGERY | Facility: CLINIC | Age: 60
End: 2025-02-24
Payer: COMMERCIAL

## 2025-02-24 DIAGNOSIS — M71.22 SYNOVIAL CYST OF POPLITEAL SPACE [BAKER], LEFT KNEE: ICD-10-CM

## 2025-02-24 DIAGNOSIS — M17.12 UNILATERAL PRIMARY OSTEOARTHRITIS, LEFT KNEE: ICD-10-CM

## 2025-02-24 DIAGNOSIS — S83.232A COMPLEX TEAR OF MEDIAL MENISCUS, CURRENT INJURY, LEFT KNEE, INITIAL ENCOUNTER: ICD-10-CM

## 2025-02-24 PROCEDURE — 99213 OFFICE O/P EST LOW 20 MIN: CPT | Mod: 25

## 2025-02-24 PROCEDURE — 20610 DRAIN/INJ JOINT/BURSA W/O US: CPT | Mod: LT

## 2025-02-24 PROCEDURE — J3490M: CUSTOM | Mod: NC,JZ

## 2025-02-25 ENCOUNTER — APPOINTMENT (OUTPATIENT)
Dept: MRI IMAGING | Facility: CLINIC | Age: 60
End: 2025-02-25
Payer: COMMERCIAL

## 2025-02-25 PROCEDURE — 73721 MRI JNT OF LWR EXTRE W/O DYE: CPT | Mod: LT
